# Patient Record
Sex: MALE | Race: WHITE
[De-identification: names, ages, dates, MRNs, and addresses within clinical notes are randomized per-mention and may not be internally consistent; named-entity substitution may affect disease eponyms.]

---

## 2017-05-16 ENCOUNTER — HOSPITAL ENCOUNTER (EMERGENCY)
Dept: HOSPITAL 62 - ER | Age: 37
Discharge: HOME | End: 2017-05-16
Payer: OTHER GOVERNMENT

## 2017-05-16 VITALS — SYSTOLIC BLOOD PRESSURE: 115 MMHG | DIASTOLIC BLOOD PRESSURE: 80 MMHG
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
## 2017-05-16 DIAGNOSIS — S60.221A: Primary | ICD-10-CM

## 2017-05-16 DIAGNOSIS — M79.641: ICD-10-CM

## 2017-05-16 PROCEDURE — 99283 EMERGENCY DEPT VISIT LOW MDM: CPT

## 2017-05-16 PROCEDURE — 2W3EX1Z IMMOBILIZATION OF RIGHT HAND USING SPLINT: ICD-10-PCS | Performed by: EMERGENCY MEDICINE

## 2017-05-16 PROCEDURE — L3984 UPPER EXT FX ORTHOSIS WRIST: HCPCS

## 2017-05-16 NOTE — ER DOCUMENT REPORT
ED General





- General


Chief Complaint: Hand Pain


Stated Complaint: RIGHT HAND PAIN


Time Seen by Provider: 05/16/17 08:11


Mode of Arrival: Ambulatory


Information source: Patient


Notes: 


36-year-old male presents after punching a wall yesterday.  Patient notes pain 

is swollen he is unable to move it secondary to pain.  Patient notes history of 

punching walls and having multiple fractures.  Patient denies any other injuries


TRAVEL OUTSIDE OF THE [place] IN LAST 30 DAYS: No





- HPI


Onset: Yesterday


Onset/Duration: Sudden


Quality of pain: Achy


Severity: Mild


Pain Level: 1


Associated symptoms: Body/muscle aches


Exacerbated by: Movement


Relieved by: Denies


Similar symptoms previously: Yes


Recently seen / treated by doctor: Yes





- Related Data


Allergies/Adverse Reactions: 


 





No Known Allergies Allergy (Verified 05/16/17 08:06)


 











Past Medical History





- Social History


Smoking Status: Former Smoker


Cigarette use (# per day): No


Chew tobacco use (# tins/day): No


Smoking Education Provided: No


Frequency of alcohol use: Social


Drug Abuse: None


Family History: Reviewed & Not Pertinent


Patient has suicidal ideation: No


Patient has homicidal ideation: No


Renal/ Medical History: Denies: Hx Peritoneal Dialysis


Psychiatric Medical History: Reports: Hx Depression


Past Surgical History: Reports: Hx Genitourinary Surgery - vasectomy, Hx Oral 

Surgery





- Immunizations


Hx Diphtheria, Pertussis, Tetanus Vaccination: Yes





Review of Systems





- Review of Systems


Notes: 


PHYSICAL EXAMINATION:





GENERAL: Well-appearing, well-nourished and in no acute distress.





HEAD: Atraumatic, normocephalic.





EYES: Pupils equal round and reactive to light, extraocular movements intact, 

sclera anicteric, conjunctiva are normal.





ENT: Nares patent, oropharynx clear without exudates.  Moist mucous membranes.





NECK: Normal range of motion, supple without lymphadenopathy





LUNGS: Breath sounds clear to auscultation bilaterally and equal.  No wheezes 

rales or rhonchi.





HEART: Regular rate and rhythm without murmurs





ABDOMEN: Soft, nontender, nondistended abdomen.  No guarding, no rebound.  No 

masses appreciated.





Musculoskeletal: Right hand swollen middle range of motion significant pain no 

obvious deformity





NEUROLOGICAL: Cranial nerves grossly intact.  Normal speech, normal gait.  

Normal sensory, motor exams 





PSYCH: Normal mood, normal affect.





SKIN: Warm, Dry, normal turgor, no rashes or lesions noted.





Physical Exam





- Vital signs


Vitals: 





 











Temp Pulse Resp BP Pulse Ox


 


 98.3 F   111 H  16   141/89 H  98 


 


 05/16/17 08:06  05/16/17 08:06  05/16/17 08:06  05/16/17 08:06  05/16/17 08:06














Course





- Re-evaluation


Re-evalutation: 





05/16/17 09:31


X-ray noted no significant abnormality, old fractures noted well-healing.  

Patient will be placed in a splint for comfort is otherwise stable for 

discharge.  Should return precautions and follow-up in 1 week as well a primary 

care physician provided











After performing a Medical Screening Examination, I estimate there is LOW risk 

for INTRACRANIAL HEMORRHAGE, UNSTABLE SPINE FRACTURE, CENTRAL CORD SYNDROME, 

CAUDA EQUINA, THORACIC AORTIC DISSECTION, PNEUMOTHORAX, PERFORATED BOWEL, 

RUPTURED ABDOMINAL AORTIC ANEURYSM, ACUTE TENDON RUPTURE, COMPARTMENT SYNDROME, 

or OPEN FRACTURE, thus I consider the discharge disposition reasonable. Also, 

there is no evidence or peritonitis, sepsis, or toxicity.  I have reevaluated 

this patient multiple times and no significant life threatening changes are 

noted. The patient and I have discussed the diagnosis and risks, and we agree 

with discharging home to follow-up with their primary doctor with the 

understanding that symptoms and presentations can change. We also discussed 

returning to the Emergency Department immediately if new or worsening symptoms 

occur. We have discussed the symptoms which are most concerning (e.g., bloody 

stool, fever, changing or worsening pain, vomiting) that necessitate immediate 

return.





- Vital Signs


Vital signs: 





 











Temp Pulse Resp BP Pulse Ox


 


 98.3 F   111 H  16   141/89 H  98 


 


 05/16/17 08:06  05/16/17 08:06  05/16/17 08:06  05/16/17 08:06  05/16/17 08:06














- Diagnostic Test


Radiology reviewed: Image reviewed, Reports reviewed - Soft tissue edema





Procedures





- Immobilization


  ** Right Hand


Time completed: 09:40


Pre-Proc Neuro Vasc Exam: Normal


Immobilizer type: Cock-up


Performed by: RN


Post-Proc Neuro Vasc Exam: Normal


Alignment checked and good: Yes





Discharge





- Discharge


Clinical Impression: 


 Right hand pain





Contusion


Qualifiers:


 Encounter type: initial encounter Contusion area: hand Laterality: right 

Qualified Code(s): S60.221A - Contusion of right hand, initial encounter





Condition: Stable


Disposition: HOME, SELF-CARE


Additional Instructions: 


Contusion





     Your injury has resulted in a contusion -- a crushing of the deep tissues.

  No injury to important structures was detected during the physician's exam.  

Contusions vary in the amount of pain they cause, and in the length of time 

required for healing.  Typically, the area will become bruised, and will remain 

painful to touch for two or three weeks.  However, most patients are back to 

working and playing within a few days.


     After the initial period of rest and cold-packs, your symptoms (together 

with the doctor's recommendations) will determine how rapidly you can get back 

to full activity.  Usually this means "do what feels okay, but don't do things 

that hurt."


     If re-examination was recommended, it's important to follow up as 

instructed.  Call the doctor or return any time if pain increases, if swelling 

becomes severe, if you develop numbness or weakness in an injured extremity, or 

if any other alarming symptoms occur.








Please follow-up with your primary care physician in one week if symptoms are 

not resolved return immediately to any other concerns


Prescriptions: 


Oxycodone HCl/Acetaminophen [Percocet 5-325 mg Tablet] 1 - 2 tab PO Q4H PRN #[id number] 

tablet


 PRN Reason:

## 2017-06-30 ENCOUNTER — HOSPITAL ENCOUNTER (EMERGENCY)
Dept: HOSPITAL 62 - ER | Age: 37
LOS: 1 days | Discharge: HOME | End: 2017-07-01
Payer: OTHER GOVERNMENT

## 2017-06-30 DIAGNOSIS — R45.851: ICD-10-CM

## 2017-06-30 DIAGNOSIS — R45.850: ICD-10-CM

## 2017-06-30 DIAGNOSIS — Z91.128: ICD-10-CM

## 2017-06-30 DIAGNOSIS — F43.10: Primary | ICD-10-CM

## 2017-06-30 DIAGNOSIS — F17.200: ICD-10-CM

## 2017-06-30 DIAGNOSIS — Z91.14: ICD-10-CM

## 2017-06-30 DIAGNOSIS — T50.906A: ICD-10-CM

## 2017-06-30 DIAGNOSIS — Z87.820: ICD-10-CM

## 2017-06-30 DIAGNOSIS — Z91.5: ICD-10-CM

## 2017-06-30 LAB
ALBUMIN SERPL-MCNC: 4.2 G/DL (ref 3.5–5)
ALP SERPL-CCNC: 81 U/L (ref 38–126)
ALT SERPL-CCNC: 36 U/L (ref 21–72)
ANION GAP SERPL CALC-SCNC: 14 MMOL/L (ref 5–19)
APPEARANCE UR: (no result)
AST SERPL-CCNC: 20 U/L (ref 17–59)
BARBITURATES UR QL SCN: NEGATIVE
BASOPHILS # BLD AUTO: 0 10^3/UL (ref 0–0.2)
BASOPHILS NFR BLD AUTO: 0.6 % (ref 0–2)
BILIRUB DIRECT SERPL-MCNC: 0.3 MG/DL (ref 0–0.4)
BILIRUB SERPL-MCNC: 0.5 MG/DL (ref 0.2–1.3)
BILIRUB UR QL STRIP: NEGATIVE
BUN SERPL-MCNC: 14 MG/DL (ref 7–20)
CALCIUM: 9.2 MG/DL (ref 8.4–10.2)
CHLORIDE SERPL-SCNC: 106 MMOL/L (ref 98–107)
CO2 SERPL-SCNC: 22 MMOL/L (ref 22–30)
CREAT SERPL-MCNC: 0.8 MG/DL (ref 0.52–1.25)
EOSINOPHIL # BLD AUTO: 0.1 10^3/UL (ref 0–0.6)
EOSINOPHIL NFR BLD AUTO: 2 % (ref 0–6)
ERYTHROCYTE [DISTWIDTH] IN BLOOD BY AUTOMATED COUNT: 12.9 % (ref 11.5–14)
ETHANOL SERPL-MCNC: 15 MG/DL
GLUCOSE SERPL-MCNC: 90 MG/DL (ref 75–110)
GLUCOSE UR STRIP-MCNC: NEGATIVE MG/DL
HCT VFR BLD CALC: 39.9 % (ref 37.9–51)
HGB BLD-MCNC: 13.3 G/DL (ref 13.5–17)
HGB HCT DIFFERENCE: 0
KETONES UR STRIP-MCNC: (no result) MG/DL
LYMPHOCYTES # BLD AUTO: 1.9 10^3/UL (ref 0.5–4.7)
LYMPHOCYTES NFR BLD AUTO: 28.1 % (ref 13–45)
MCH RBC QN AUTO: 28.4 PG (ref 27–33.4)
MCHC RBC AUTO-ENTMCNC: 33.3 G/DL (ref 32–36)
MCV RBC AUTO: 85 FL (ref 80–97)
METHADONE UR QL SCN: NEGATIVE
MONOCYTES # BLD AUTO: 0.7 10^3/UL (ref 0.1–1.4)
MONOCYTES NFR BLD AUTO: 9.9 % (ref 3–13)
NEUTROPHILS # BLD AUTO: 3.9 10^3/UL (ref 1.7–8.2)
NEUTS SEG NFR BLD AUTO: 59.4 % (ref 42–78)
NITRITE UR QL STRIP: NEGATIVE
PCP UR QL SCN: NEGATIVE
PH UR STRIP: 5 [PH] (ref 5–9)
POTASSIUM SERPL-SCNC: 3.6 MMOL/L (ref 3.6–5)
PROT SERPL-MCNC: 7.2 G/DL (ref 6.3–8.2)
PROT UR STRIP-MCNC: NEGATIVE MG/DL
RBC # BLD AUTO: 4.69 10^6/UL (ref 4.35–5.55)
SODIUM SERPL-SCNC: 141.6 MMOL/L (ref 137–145)
SP GR UR STRIP: 1.02
URINE OPIATES LOW: NEGATIVE
UROBILINOGEN UR-MCNC: NEGATIVE MG/DL (ref ?–2)
WBC # BLD AUTO: 6.7 10^3/UL (ref 4–10.5)

## 2017-06-30 PROCEDURE — 85025 COMPLETE CBC W/AUTO DIFF WBC: CPT

## 2017-06-30 PROCEDURE — 80307 DRUG TEST PRSMV CHEM ANLYZR: CPT

## 2017-06-30 PROCEDURE — 80053 COMPREHEN METABOLIC PANEL: CPT

## 2017-06-30 PROCEDURE — 99285 EMERGENCY DEPT VISIT HI MDM: CPT

## 2017-06-30 PROCEDURE — 93005 ELECTROCARDIOGRAM TRACING: CPT

## 2017-06-30 PROCEDURE — 93010 ELECTROCARDIOGRAM REPORT: CPT

## 2017-06-30 PROCEDURE — 81001 URINALYSIS AUTO W/SCOPE: CPT

## 2017-06-30 PROCEDURE — 36415 COLL VENOUS BLD VENIPUNCTURE: CPT

## 2017-07-01 VITALS — DIASTOLIC BLOOD PRESSURE: 86 MMHG | SYSTOLIC BLOOD PRESSURE: 151 MMHG

## 2017-07-01 NOTE — ER DOCUMENT REPORT
ED Psych Disorder / Suicide





- General


Mode of Arrival: Ambulatory


Information source: Patient


TRAVEL OUTSIDE OF THE [place] IN LAST 30 DAYS: No





- HPI


Patient complains to provider of: Homicidal ideation - Passive; no plan, means 

or intent, Suicidal ideation - Passive; no plan, means or intent


Normal mood: Yes


Associated symptoms: Normal affect, Normal mood





<HARRINGTONLATESHA - Last Filed: 07/01/17 09:37>





<ANNA ROBLES - Last Filed: 07/01/17 10:05>





- General


Chief Complaint: Suicidal Ideation


Stated Complaint: PSYCH EVALUATION


Time Seen by Provider: 06/30/17 21:21





- HPI


Notes: 


Patient is a 36 year old male with past medical history of PTSD, traumatic 

brain injury, apparently off all medications at this time for the past 1-2 

years who presents with passive suicidal ideation.  Patient states that he got 

into an argument with his wife just prior to arrival, became increasingly angry 

and decided rather than punching a wall he wanted to he decided to drive to the 

emergency department.  





Patient disclosed that [place] [facility] [place] and by 2007 he 

needed inpatient.  He continued disclosed that from [id number] he was taking 

medications and things are great.  [facility] continued disclosed [facility] and [facility] wanted to [facility].  He became upset 

because he did not want to change his medication since they were working.  His 

medications ended up changing anyway; at this time patient reports he stopped 

taking his meds patient.  He continued disclosed that he has anger problems and 

has passive thoughts of hurting himself or others.  He continued disclosed that 

last night arguing with his wife in instead of her or himself he thought that 

he should come into the emergency department.  Patient states he understands he 

[facility]





Patient is alert and orientated to person place time and circumstance.  Mood is 

euthymic with congruent affect.  Patient endorses passive suicidal ideation and 

homicidal ideation with no plan means or intent.  Patient denies auditory 

visual hallucinations; patient is not demonstrating any behavior congruent with 

responding to internal stimuli.  No delusions are noted.  Thought processes 

organized and linear.  Conversational speech was within normal rate tone and 

prosody.  Eye contact was well-maintained.  Intellectual abilities appear to be 

within average range.  Attention and concentration are good.  Insight, judgment

, impulse control are good.





309.81 (F 43.10) posttraumatic stress per history provided by patient


Traumatic brain injury per history provided by patient





Impression\plan: Patient is considered psychiatrically clear for discharge.  

Patient does not meet IVC criteria per [place]  2C.  Patient suffers from 

passive suicidal homicidal ideation.  Patient demonstrated positive insight and 

judgment by coming to the emergency instead of lashing out at himself or loved 

ones.  [facility] her both 

his mental health and medical treatment.  Dr. Josue was consulted on the care 

and management of this patient; attending physician is in agreement with 

recommendations and disposition. (LATESHA HARRINGTON)





- Related Data


Allergies/Adverse Reactions: 


 





No Known Allergies Allergy (Verified 05/16/17 08:06)


 








Home Medications: 


 Current Home Medications





No Home Medications  07/01/17 [History]











Past Medical History





- General


Information source: Patient





- Social History


Smoking Status: Current Every Day Smoker


Chew tobacco use (# tins/day): No


Frequency of alcohol use: Social


Drug Abuse: None


Lives with: Spouse/Significant other


Family History: Reviewed & Not Pertinent


Patient has suicidal ideation: No


Patient has homicidal ideation: No


Renal/ Medical History: Denies: Hx Peritoneal Dialysis


Psychiatric Medical History: Reports: Hx Depression


Past Surgical History: Reports: Hx Genitourinary Surgery - vasectomy, Hx Oral 

Surgery





- Immunizations


Hx Diphtheria, Pertussis, Tetanus Vaccination: Yes





<LATESHA HARRINGTON - Last Filed: 07/01/17 09:37>





Course





- Laboratory


Result Diagrams: 


 06/30/17 21:50





 06/30/17 21:50





<LATESHA HARRINGTON - Last Filed: 07/01/17 09:37>





- Laboratory


Result Diagrams: 


 06/30/17 21:50





 06/30/17 21:50





<ANNA ROBLES - Last Filed: 07/01/17 10:05>





- Vital Signs


Vital signs: 





 











Temp Pulse Resp BP Pulse Ox


 


 97.5 F   70   18   132/75 H  96 


 


 07/01/17 06:47  07/01/17 06:47  07/01/17 06:47  07/01/17 06:47  07/01/17 06:47














- Laboratory


Laboratory results interpreted by me: 





 











  06/30/17 06/30/17 06/30/17





  21:50 21:50 21:50


 


Hgb  13.3 L  


 


Urine Ketones    TRACE H


 


Salicylates   < 1.0 L 


 


Acetaminophen   < 10 L 














Discharge





<LATESHA HARRINGTON - Last Filed: 07/01/17 09:37>





<TRAVISANNA - Last Filed: 07/01/17 10:05>





- Discharge


Clinical Impression: 


 Suicidal ideation, Posttraumatic stress disorder





Clinical Impression: 


 (Ruled Out): Traumatic brain injury


Condition: Stable


Disposition: HOME, SELF-CARE


Additional Instructions: 


DEPRESSION:


     Your evaluation reveals that you have mental depression. While symptoms 

may be vague, they often include disturbance of sleep, fatigue, loss of appetite

, and general loss of interest in life.  While depression may be a side effect 

of drugs, or a reaction to a major change in your life, many cases have no 

known cause.


     If depression is acute, and related to a major loss in your life, you can 

expect it to clear completely with time.  If you have been depressed a long time

, are prone to repeated bouts of depression or low mood, or have been thinking 

of suicide, get help.


     Depression can be treated with anti-depressant medication and counselling.

  Long-term depression will often take a few weeks to clear, even with 

appropriate medication.  Follow-up care is important.








SUICIDAL IDEATION:


     Suicidal ideation is a common medical term for thoughts about suicide, 

which may be as detailed as a formulated plan, without the suicidal act itself. 

Although most people who undergo suicidal ideation do not commit suicide, some 

go on to make suicide attempts. The range of suicidal ideation varies greatly 

from fleeting to detailed planning, role playing, and unsuccessful attempts.





     While thoughts about suicide are common, most people do not carry out 

serious actions to commit suicide.  Based upon your evaluation and discussion 

with you, we do not believe you are currently at risk to act upon your thoughts 

of suicide.  You have agreed to return to the Emergency Department, at any time

, if you feel inclined to act upon your suicidal thoughts.








FOLLOW-UP CARE:


[facility]

[facility].  If you experience worsening or a significant change in your symptoms, 

notify the physician immediately or return to the Emergency Department at any 

time for re-evaluation.





Referrals: 


[facility] [Provider Group] - 07/03/17

## 2017-07-01 NOTE — ER DOCUMENT REPORT
Doctor's Note


Notes: 


07/01/17 14:14


Rounds: Chart reviewed and patient interviewed.  Vital signs are all 

essentially normal.  Lab studies were essentially normal except for an alcohol 

level of 15.  Patient appears medically stable for transfer or discharge.  

Mental health has assessed the patient feels he can be discharged [facility] 

[facility].


RAVINDRA Hampton MD

## 2017-07-01 NOTE — ER DOCUMENT REPORT
ED General





- General


Chief Complaint: Suicidal Ideation


Stated Complaint: PSYCH EVALUATION


Time Seen by Provider: 06/30/17 21:21


Notes: 


Patient is a 36 year old male with past medical history of PTSD, traumatic 

brain injury, apparently off all medications at this time for the past 1-2 

years who presents with passive suicidal ideation.  Patient states that he got 

into an argument with his wife just prior to arrival, became increasingly angry 

and decided rather than punching a wall he wanted to he decided to drive to the 

emergency department.  Patient states that he frequently feels suicidal towards 

himself, more so today but denies any specific plan or intention.  Denies any 

homicidal ideation but notes that he is [place] and angry towards his wife and 

children and thinks about hurting them but has never done so.  Admits to past 

suicide attempts with hospitalizations.  He has not spoken to his primary 

doctor or psychiatrist today regarding his concerns.  Does have access to 

firearms.


TRAVEL OUTSIDE OF THE [place] IN LAST 30 DAYS: No





- Related Data


Allergies/Adverse Reactions: 


 





No Known Allergies Allergy (Verified 05/16/17 08:06)


 











Past Medical History





- General


Information source: Patient





- Social History


Smoking Status: Current Every Day Smoker


Chew tobacco use (# tins/day): No


Frequency of alcohol use: Social


Drug Abuse: None


Lives with: Spouse/Significant other


Family History: Reviewed & Not Pertinent


Patient has suicidal ideation: No


Patient has homicidal ideation: No


Renal/ Medical History: Denies: Hx Peritoneal Dialysis


Psychiatric Medical History: Reports: Hx Depression


Past Surgical History: Reports: Hx Genitourinary Surgery - vasectomy, Hx Oral 

Surgery





- Immunizations


Hx Diphtheria, Pertussis, Tetanus Vaccination: Yes





Review of Systems





- Review of Systems


Notes: 


Constitutional: Negative for fever.


HENT: Negative for sore throat.


Eyes: Negative for visual changes.


Cardiovascular: Negative for chest pain.


Respiratory: Negative for shortness of breath.


Gastrointestinal: Negative for abdominal pain, vomiting or diarrhea.


Genitourinary: Negative for dysuria.


Musculoskeletal: Negative for back pain.


Skin: Negative for rash.


Neurological: Negative for headaches, weakness or numbness.





10 point ROS negative except as marked above and in HPI.





Physical Exam





- Vital signs


Vitals: 





 











Temp Pulse Resp BP Pulse Ox


 


 98.4 F   89   18   138/83 H  96 


 


 06/30/17 20:45  06/30/17 20:45  06/30/17 20:45  06/30/17 20:45  06/30/17 20:45











Interpretation: Normal


Notes: 


PHYSICAL EXAMINATION:





GENERAL: Well-appearing, well-nourished and in no acute distress.





HEAD: Atraumatic, normocephalic.





EYES: Pupils equal round and reactive to light, extraocular movements intact, 

sclera anicteric, conjunctiva are normal.





ENT: nares patent, oropharynx clear without exudates.  Moist mucous membranes.





NECK: Normal range of motion, supple without lymphadenopathy





LUNGS: Breath sounds clear to auscultation bilaterally and equal.  No wheezes 

rales or rhonchi.





HEART: Regular rate and rhythm without murmurs





ABDOMEN: Soft, nontender, normoactive bowel sounds.  No guarding, no rebound.  

No masses appreciated.





EXTREMITIES: Normal range of motion, no pitting or edema.  No cyanosis.





NEUROLOGICAL: No focal neurological deficits. Moves all extremities 

spontaneously and on command.





PSYCH: Somewhat agitated initially but calm and cooperative otherwise.  

Pressured speech.





SKIN: Warm, Dry, normal turgor, no rashes or lesions noted.





Course





- Re-evaluation


Re-evalutation: 





07/01/17 02:53


Patient presents with suicidal ideation without specific plan or intent.  

Patient presents somewhat agitated stating that he could hurt anybody any time 

but mostly his focus on himself.  States that certain from this way after an 

argument with his wife tonight.  He has been off all medications for PTSD for 

the past several years.  Patient denies any acute medical complaints.  Given 

the patient does not have any specific plans and states that he has no direct 

intention of completing suicide, will not place under IVC at this time.  His 

medical screening laboratories have been obtained and are noted to be 

unremarkable.  He is cleared for evaluation by psychiatry.





- Vital Signs


Vital signs: 





 











Temp Pulse Resp BP Pulse Ox


 


 98.4 F   89   18   138/83 H  96 


 


 06/30/17 20:45  06/30/17 20:45  06/30/17 20:45  06/30/17 20:45  06/30/17 20:45














- Laboratory


Result Diagrams: 


 06/30/17 21:50





 06/30/17 21:50


Laboratory results interpreted by me: 





 











  06/30/17 06/30/17 06/30/17





  21:50 21:50 21:50


 


Hgb  13.3 L  


 


Urine Ketones    TRACE H


 


Salicylates   < 1.0 L 


 


Acetaminophen   < 10 L 














- EKG Interpretation by Me


Additional EKG results interpreted by me: 





07/01/17 02:54


Normal sinus rhythm.  Rate 89.  No ST elevations or depressions.  QTC is 448.





Discharge





- Discharge


Clinical Impression: 


 Suicidal ideation





Condition: Stable


Disposition: PSYCH HOSP/UNIT

## 2017-07-01 NOTE — EKG REPORT
SEVERITY:- OTHERWISE NORMAL ECG -

SINUS OR ECTOPIC ATRIAL RHYTHM

LOW VOLTAGE IN FRONTAL LEADS

:

Confirmed by: Ling Barr 01-Jul-2017 13:40:35

## 2017-12-21 ENCOUNTER — HOSPITAL ENCOUNTER (OUTPATIENT)
Dept: HOSPITAL 62 - SP | Age: 37
End: 2017-12-21
Attending: NURSE PRACTITIONER
Payer: MEDICARE

## 2017-12-21 DIAGNOSIS — M79.671: Primary | ICD-10-CM

## 2017-12-21 DIAGNOSIS — R60.9: ICD-10-CM

## 2017-12-21 PROCEDURE — 93971 EXTREMITY STUDY: CPT

## 2017-12-21 NOTE — RADIOLOGY REPORT (SQ)
EXAM DESCRIPTION:  FOOT RIGHT COMPLETE



COMPLETED DATE/TIME:  12/21/2017 11:31 am



REASON FOR STUDY:  M79.671 ARCH PAIN IN RIGHT FOOT R60.9  EDEMA, UNSPECIFIED



COMPARISON:  None.



NUMBER OF VIEWS:  Three views.



TECHNIQUE:  AP, lateral and oblique  radiographic images acquired of the right foot.



LIMITATIONS:  None.



FINDINGS:  MINERALIZATION: Normal.

BONES: No acute fracture or dislocation.  No tarsal coalition

JOINTS: No effusions.

SOFT TISSUES: Dorsal forefoot soft tissue swelling.  No foreign body.

OTHER: No other significant finding.



IMPRESSION:  Forefoot soft tissue swelling.  No acute fracture or malalignment.  No plain film eviden
ce of tarsal coalition



TECHNICAL DOCUMENTATION:  JOB ID:  [id number]

 2011 [employer]- All Rights Reserved

## 2017-12-21 NOTE — RADIOLOGY REPORT (SQ)
EXAM DESCRIPTION:  VENOUS UNILATERAL LOWER



COMPLETED DATE/TIME:  12/21/2017 11:33 am



REASON FOR STUDY:  RLE EDEMA R60.9  EDEMA, UNSPECIFIED



COMPARISON:  None.



TECHNIQUE:  Dynamic and static gray scale and color images acquired of the right leg venous system. S
elected spectral images acquired with additional compression and augmentation maneuvers. The contrala
teral common femoral vein and saphenofemoral junction were also imaged. Images stored on PACS.



LIMITATIONS:  None.



FINDINGS:  RIGHT

COMMON FEMORAL: Normal phasicity, compression and augmentation. No visualized echogenic material on g
ray scale. No defects on color images.

FEMORAL: Normal compression and augmentation. No visualized echogenic material on gray scale. No defe
cts on color images.

POPLITEAL: Normal compression, augmentation. No visualized echogenic material on gray scale. No defec
ts on color images.

CALF VESSELS: Normal compression, augmentation. No visualized echogenic material on gray scale. No de
fects on color images.

GSV and SSV: Normal compression, augmentation. No visualized echogenic material on gray scale. No def
ects on color images.

ANY DEEP VENOUS INSUFFICIENCY: Not evaluated.

ANY EVIDENCE OF POPLITEAL CYST: No.

OTHER: No other significant finding.

LEFT COMMON FEMORAL VEIN AND SAPHENOFEMORAL JUNCTION:

Normal phasicity, compression and augmentation. No visualized echogenic material on gray scale. No de
fects on color images.



IMPRESSION:  NO EVIDENCE OF DVT OR SVT IN THE RIGHT LEG.



TECHNICAL DOCUMENTATION:  JOB ID:  [id number]

 2011 [employer]- All Rights Reserved

## 2018-04-21 ENCOUNTER — HOSPITAL ENCOUNTER (EMERGENCY)
Dept: HOSPITAL 62 - ER | Age: 38
Discharge: HOME | End: 2018-04-21
Payer: MEDICARE

## 2018-04-21 VITALS — DIASTOLIC BLOOD PRESSURE: 77 MMHG | SYSTOLIC BLOOD PRESSURE: 125 MMHG
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
## 2018-04-21 DIAGNOSIS — S60.222A: Primary | ICD-10-CM

## 2018-04-21 DIAGNOSIS — Z87.891: ICD-10-CM

## 2018-04-21 DIAGNOSIS — M79.89: ICD-10-CM

## 2018-04-21 DIAGNOSIS — S60.512A: ICD-10-CM

## 2018-04-21 DIAGNOSIS — M79.642: ICD-10-CM

## 2018-04-21 PROCEDURE — 90471 IMMUNIZATION ADMIN: CPT

## 2018-04-21 PROCEDURE — 99283 EMERGENCY DEPT VISIT LOW MDM: CPT

## 2018-04-21 PROCEDURE — 90715 TDAP VACCINE 7 YRS/> IM: CPT

## 2018-04-21 NOTE — ER DOCUMENT REPORT
ED Hand/Wrist Injury





- General


Mode of Arrival: Ambulatory


Information source: Patient


TRAVEL OUTSIDE OF THE [place] IN LAST 30 DAYS: No





<PRADEEP BOYD - Last Filed: 04/21/18 17:10>





- HPI


Severity: Moderate


Context: Blow





<SLAVA MAE - Last Filed: 04/21/18 17:53>





- General


Chief Complaint: Hand Injury


Stated Complaint: LEFT HAND INJURY


Time Seen by Provider: 04/21/18 15:04


Notes: 





37 y.o male presents to the ED with injury to his LT hand. He reports that he 

was helping a neighbor plant a tree and missed the stake and hit his LT hand 

with a hammer about an hour ago. Unknown if tetanus is up to date. (PRADEEP BOYD)





- Related Data


Allergies/Adverse Reactions: 


 





acetaminophen [From Vicodin] Adverse Reaction (Verified 04/21/18 14:56)


 


hydrocodone [From Vicodin] Adverse Reaction (Verified 04/21/18 14:56)


 











Past Medical History





- General


Information source: Patient





- Social History


Smoking Status: Former Smoker


Chew tobacco use (# tins/day): No


Frequency of alcohol use: Social


Drug Abuse: None


Family History: Reviewed & Not Pertinent


Patient has suicidal ideation: No


Patient has homicidal ideation: No


Renal/ Medical History: Denies: Hx Peritoneal Dialysis


Psychiatric Medical History: Reports: Hx Depression


Past Surgical History: Reports: Hx Genitourinary Surgery - vasectomy, Hx Oral 

Surgery





- Immunizations


Hx Diphtheria, Pertussis, Tetanus Vaccination: Yes





<PRADEEP BOYD - Last Filed: 04/21/18 17:10>





Review of Systems





- Review of Systems


Constitutional: No symptoms reported


EENT: No symptoms reported


Cardiovascular: No symptoms reported


Respiratory: No symptoms reported


Gastrointestinal: No symptoms reported


Genitourinary: No symptoms reported


Male Genitourinary: No symptoms reported


Musculoskeletal: Other - LT hand injury


Skin: Other - abrasion, edema


Hematologic/Lymphatic: No symptoms reported


Neurological/Psychological: No symptoms reported


-: Yes All other systems reviewed and negative





<PRADEEP BOYD - Last Filed: 04/21/18 17:10>





Physical Exam





<PRADEEP BOYD - Last Filed: 04/21/18 17:10>





<SLAVA MAE - Last Filed: 04/21/18 17:53>





- Vital signs


Vitals: 


 











Temp Pulse Resp BP Pulse Ox


 


 98.0 F   104 H  20   142/86 H  96 


 


 04/21/18 15:01  04/21/18 15:01  04/21/18 15:01  04/21/18 15:01  04/21/18 15:01














- Notes


Notes: 





Physical Exam:


 


General: Alert, appears well. 


 


HEENT: Normocephalic. Atraumatic. PERRLA. Extraocular movements intact. 


 


Neck: Supple. 


 


Respiratory: No respiratory distress. 


 


Abdominal: Normal Inspection. No distension. 


 


Extremities: LT hand with significant swelling to the web space between the 1st 

and 2nd digits with superficial abrasion to the dorsal aspect overtop the 

swelling. Tender to palpation across the 2nd metacarpal.


 


Neurological: Normal cognition. AAOx4. Normal speech.  


 


Psychological: Normal affect. Normal Mood. 


 


Skin: Superficial abrasion to LT hand.  (PRADEEP BOYD)





Course





<PRADEEP BOYD - Last Filed: 04/21/18 17:10>





<SLAVA MAE - Last Filed: 04/21/18 17:53>





- Re-evaluation


Re-evalutation: 





04/21/18 16:21


Consistent with hematoma and contusion to the soft tissue, x-ray does not show 

any signs of fracture or subcutaneous air.  Discharged home with instructions 

on ice, anti-inflammatories and elevation. (SLAVA MAE)





- Vital Signs


Vital signs: 


 











Temp Pulse Resp BP Pulse Ox


 


 97.7 F   87   16   125/77   95 


 


 04/21/18 16:38  04/21/18 16:38  04/21/18 16:38  04/21/18 16:38  04/21/18 16:38














Discharge





<PRADEEP BOYD - Last Filed: 04/21/18 17:10>





<SLAVA MAE - Last Filed: 04/21/18 17:53>





- Discharge


Clinical Impression: 


Contusion of left hand


Qualifiers:


 Encounter type: initial encounter Qualified Code(s): S60.222A - Contusion of 

left hand, initial encounter





Abrasion of left hand


Qualifiers:


 Encounter type: initial encounter Qualified Code(s): S60.512A - Abrasion of 

left hand, initial encounter





Condition: Stable


Disposition: HOME, SELF-CARE


Additional Instructions: 


Your x-ray did not show any signs of fracture.  You do not need to wear splint.

  Keep the hand elevated, take ibuprofen 800 mg every 8 hours as needed for 

pain.  You may use ice on for 15 minutes off for the rest of the hour.  Please 

return to the emergency department for any new or concerning symptoms.


Referrals: 


BRI CH, NP [Primary Care Provider] - Follow up in 1 week


Scribe Attestation: 





04/21/18 17:53


I personally performed the services described in the documentation, reviewed 

and edited the documentation which was dictated to the scribe in my presence, 

and it accurately records my words and actions. (SLAVA MAE)





Scribe Documentation





- Scribe


Written by Jesenia:: Jesenia Ontiveros 4/21/2018 1519


acting as scribe for :: Lisy





<PRADEEP BOYD - Last Filed: 04/21/18 17:10>

## 2018-04-21 NOTE — RADIOLOGY REPORT (SQ)
EXAM DESCRIPTION:  HAND LEFT 3 VIEWS



COMPLETED DATE/TIME:  4/21/2018 3:33 pm



REASON FOR STUDY:  hit left hand with hammer, swelling 2nd metacarpal



COMPARISON:  None.



EXAM PARAMETERS:  NUMBER OF VIEWS: Three views.

TECHNIQUE: AP, lateral and oblique  radiographic images acquired of the left hand.

LIMITATIONS: None.



FINDINGS:  MINERALIZATION: Normal.

BONES: No acute fracture or dislocation.  No worrisome bone lesions.

JOINTS: No effusions.

SOFT TISSUES: No soft tissue swelling.  No foreign body.

OTHER: No other significant finding.



IMPRESSION:  NEGATIVE STUDY OF THE LEFT HAND. NO RADIOGRAPHIC EVIDENCE OF ACUTE INJURY.



TECHNICAL DOCUMENTATION:  JOB ID:  [id number]

 2011 [employer]- All Rights Reserved



Reading location - IP/workstation name: [email or web address]